# Patient Record
Sex: MALE | Race: OTHER | HISPANIC OR LATINO | URBAN - METROPOLITAN AREA
[De-identification: names, ages, dates, MRNs, and addresses within clinical notes are randomized per-mention and may not be internally consistent; named-entity substitution may affect disease eponyms.]

---

## 2024-07-29 ENCOUNTER — APPOINTMENT (RX ONLY)
Dept: URBAN - METROPOLITAN AREA CLINIC 147 | Facility: CLINIC | Age: 69
Setting detail: DERMATOLOGY
End: 2024-07-29

## 2024-07-29 DIAGNOSIS — L60.3 NAIL DYSTROPHY: ICD-10-CM

## 2024-07-29 PROBLEM — L60.9 NAIL DISORDER, UNSPECIFIED: Status: ACTIVE | Noted: 2024-07-29

## 2024-07-29 PROCEDURE — 99203 OFFICE O/P NEW LOW 30 MIN: CPT

## 2024-07-29 PROCEDURE — ? ORDER TESTS

## 2024-07-29 PROCEDURE — ? NAIL CLIPPING FOR PATHOLOGY

## 2024-07-29 PROCEDURE — ? PRESCRIPTION

## 2024-07-29 PROCEDURE — ? ADDITIONAL NOTES

## 2024-07-29 PROCEDURE — ? COUNSELING

## 2024-07-29 RX ORDER — HYDROCORTISONE 25 MG/G
CREAM TOPICAL
Qty: 30 | Refills: 2 | Status: ERX | COMMUNITY
Start: 2024-07-29

## 2024-07-29 RX ADMIN — HYDROCORTISONE: 25 CREAM TOPICAL at 00:00

## 2024-07-29 ASSESSMENT — LOCATION DETAILED DESCRIPTION DERM
LOCATION DETAILED: RIGHT THUMBNAIL
LOCATION DETAILED: RIGHT DISTAL ULNAR THUMB
LOCATION DETAILED: RIGHT THUMBNAIL

## 2024-07-29 ASSESSMENT — LOCATION ZONE DERM
LOCATION ZONE: FINGERNAIL
LOCATION ZONE: FINGER
LOCATION ZONE: FINGERNAIL

## 2024-07-29 ASSESSMENT — LOCATION SIMPLE DESCRIPTION DERM
LOCATION SIMPLE: RIGHT HAND
LOCATION SIMPLE: RIGHT THUMB
LOCATION SIMPLE: RIGHT HAND

## 2024-07-29 NOTE — PROCEDURE: ORDER TESTS
Clinical Notes (To The Lab): Standing order for 6 months
Billing Type: Third-Party Bill
Bill For Surgical Tray: no
Performing Laboratory: 0
Expected Date Of Service: 07/29/2024

## 2024-07-29 NOTE — PROCEDURE: ADDITIONAL NOTES
Render Risk Assessment In Note?: no
Additional Notes: R/o koilonychia vs fungal vs brittle nails. Only thumb nails affected. Moisturize cuticles with hydrocortisone.
Detail Level: Simple

## 2024-10-01 ENCOUNTER — APPOINTMENT (RX ONLY)
Dept: URBAN - METROPOLITAN AREA CLINIC 147 | Facility: CLINIC | Age: 69
Setting detail: DERMATOLOGY
End: 2024-10-01

## 2024-10-01 DIAGNOSIS — L60.3 NAIL DYSTROPHY: ICD-10-CM

## 2024-10-01 PROCEDURE — ? LAB REPORTS REVIEWED

## 2024-10-01 PROCEDURE — ? ADDITIONAL NOTES

## 2024-10-01 PROCEDURE — ? COUNSELING

## 2024-10-01 PROCEDURE — 99212 OFFICE O/P EST SF 10 MIN: CPT

## 2024-10-01 ASSESSMENT — LOCATION DETAILED DESCRIPTION DERM: LOCATION DETAILED: RIGHT DISTAL ULNAR THUMB

## 2024-10-01 ASSESSMENT — LOCATION SIMPLE DESCRIPTION DERM: LOCATION SIMPLE: RIGHT THUMB

## 2024-10-01 ASSESSMENT — LOCATION ZONE DERM: LOCATION ZONE: FINGER

## 2024-10-01 NOTE — PROCEDURE: ADDITIONAL NOTES
Render Risk Assessment In Note?: no
Detail Level: Simple
Additional Notes: Reviewed labs. Iron levels normal. Normal nail is growing out. Recommended to continue using hydrocortisone and urea.\\n\\nAdvised patient to see PCP for low platelets.

## 2024-10-01 NOTE — PROCEDURE: LAB REPORTS REVIEWED
Detail Level: Zone
Summarized Lab Results: Normal iron levels. Recommend to follow up with PCP for low platelets.